# Patient Record
Sex: FEMALE | Race: WHITE | Employment: STUDENT | URBAN - METROPOLITAN AREA
[De-identification: names, ages, dates, MRNs, and addresses within clinical notes are randomized per-mention and may not be internally consistent; named-entity substitution may affect disease eponyms.]

---
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
## 2021-02-07 ENCOUNTER — HEALTH MAINTENANCE LETTER (OUTPATIENT)
Age: 21
End: 2021-02-07

## 2021-09-18 ENCOUNTER — HEALTH MAINTENANCE LETTER (OUTPATIENT)
Age: 21
End: 2021-09-18

## 2022-03-05 ENCOUNTER — HEALTH MAINTENANCE LETTER (OUTPATIENT)
Age: 22
End: 2022-03-05

## 2022-03-23 NOTE — PROGRESS NOTES
"Subjective:    Patient is a 17 year old female presenting with rehab left knee hpi. The history is provided by the patient. No  was used.   Crissy Carranza is a 17 year old female with a left knee condition.  Condition occurred with:  Repetition/overuse.  Condition occurred: during recreation/sport.  This is a chronic condition  Onset of L anterior/ inferior knee pain ~ 1 year ago that has progessively worsened with returning to dance this fall and stairs at school. Does 7 hours/dance week. WORSE with stairs ascending/descending, full squat/weight on it. BETTER with icing/heat. Goal is to not have pain and be able to dance more than 1 hour without resting. 10th grade at [employer]. .    Patient reports pain:  Anterior and sub patellar.    Pain is described as aching and is intermittent and reported as 5/10.  Associated symptoms:  Locking (\"popping\"). Pain is worse in the A.M..     Since onset symptoms are gradually worsening.        General health as reported by patient is good.  Pertinent medical history includes:  Depression and migraines.  Medical allergies: no.  Other surgeries include:  None reported.  Current medications:  Anti-depressants and other (acne).  Current occupation is student.                                    Objective:    Standing Alignment:              Knee:  Normal  Ankle/foot deviations: very mild pes planus R and L.    Gait:      Deviations:  Hip:  Trendelenberg L and Trendelenberg R                                                      Knee Evaluation:  ROM:  Strength wnl knee: B hip abductors 3+/5.    PROM    Hyperextension: Left: 8    Right:  5  Extension: Left: 0    Right:  0  Flexion: Left: WNL    Right:  WNL  Pain: no pain with PROM    Strength:         Quad Set Left:  Good    Pain: -   Quad Set Right:  Good    Pain: -  Ligament Testing:  Not Assessed                Special Tests:     Left knee negative for the following special tests:  " Bilateral lower extremity Doppler venous ultrasound



INDICATION: Shortness of breath



FINDINGS: Bilateral common femoral veins, superficial femoral veins and popliteal veins have normal c
ompressibility and phasic flow.



IMPRESSION:

No evidence for DVT.



Signer Name: Orestes Quan MD 

Signed: 3/23/2022 10:32 AM

Workstation Name: [id number] Meninscal; Patellar compression; Patellar Tracking-Abduction Medial and Patellar Tracking-Abduction Lateral    Right knee negative for the following special tests:  Meniscal; Patellar Compression; Patellar Tracking-Abduction Medial and Patellar Tracking-Abduction Lateral  Palpation:    Left knee tenderness present at:  Medial Joint Line; Lateral Joint Line and Patellar Tendon    Edema:  Normal    Mobility Testing:  Normal            Functional Testing:          Quad:    Single Leg Squat:  Left:      Right:        Bilateral Leg Squat:   Excessive anterior knee excursion and decreased hip/trunk flexion      Proprioception:   Stork Balance Test:  Left:  SLS with knee extension locks into hyperextension, able to control with cues  Right:  WNL  % of Uninvolved:           General     ROS    Assessment/Plan:      Patient is a 17 year old female with left side knee complaints.    Patient has the following significant findings with corresponding treatment plan.                Diagnosis 1:  L knee pain  Pain -  hot/cold therapy, manual therapy, splint/taping/bracing/orthotics, self management, education and home program  Decreased strength - therapeutic exercise and therapeutic activities  Decreased proprioception - neuro re-education and therapeutic activities  Inflammation - cold therapy and self management/home program  Impaired gait - gait training  Impaired muscle performance - neuro re-education  Decreased function - therapeutic activities    Therapy Evaluation Codes:   1) History comprised of:   Personal factors that impact the plan of care:      None.    Comorbidity factors that impact the plan of care are:      None.     Medications impacting care: None.  2) Examination of Body Systems comprised of:   Body structures and functions that impact the plan of care:      Knee.   Activity limitations that impact the plan of care are:      Sports, Squatting/kneeling and Stairs.  3) Clinical presentation characteristics  are:   Stable/Uncomplicated.  4) Decision-Making    Low complexity using standardized patient assessment instrument and/or measureable assessment of functional outcome.  Cumulative Therapy Evaluation is: Low complexity.    Previous and current functional limitations:  (See Goal Flow Sheet for this information)    Short term and Long term goals: (See Goal Flow Sheet for this information)     Communication ability:  Patient appears to be able to clearly communicate and understand verbal and written communication and follow directions correctly.  Treatment Explanation - The following has been discussed with the patient:   RX ordered/plan of care  Anticipated outcomes  Possible risks and side effects  This patient would benefit from PT intervention to resume normal activities.   Rehab potential is excellent.    Frequency:  1 X week, once daily  Duration:  for 6 weeks  Discharge Plan:  Achieve all LTG.  Independent in home treatment program.  Reach maximal therapeutic benefit.    Please refer to the daily flowsheet for treatment today, total treatment time and time spent performing 1:1 timed codes.

## 2022-11-20 ENCOUNTER — HEALTH MAINTENANCE LETTER (OUTPATIENT)
Age: 22
End: 2022-11-20

## 2022-12-24 ENCOUNTER — HEALTH MAINTENANCE LETTER (OUTPATIENT)
Age: 22
End: 2022-12-24

## 2024-01-28 ENCOUNTER — HEALTH MAINTENANCE LETTER (OUTPATIENT)
Age: 24
End: 2024-01-28